# Patient Record
Sex: FEMALE | Race: ASIAN | NOT HISPANIC OR LATINO | Employment: OTHER | URBAN - METROPOLITAN AREA
[De-identification: names, ages, dates, MRNs, and addresses within clinical notes are randomized per-mention and may not be internally consistent; named-entity substitution may affect disease eponyms.]

---

## 2020-07-27 NOTE — PATIENT DISCUSSION
"""Did not have the right trial lens in office. Ordering right and left lens for patient to  and call us to finalize.  Order trials

## 2021-04-29 ENCOUNTER — NEW PATIENT COMPREHENSIVE (OUTPATIENT)
Dept: URBAN - METROPOLITAN AREA CLINIC 35 | Facility: CLINIC | Age: 56
End: 2021-04-29

## 2021-04-29 DIAGNOSIS — H11.132: ICD-10-CM

## 2021-04-29 DIAGNOSIS — H43.393: ICD-10-CM

## 2021-04-29 DIAGNOSIS — H52.11: ICD-10-CM

## 2021-04-29 PROCEDURE — 92004 COMPRE OPH EXAM NEW PT 1/>: CPT

## 2021-04-29 PROCEDURE — 92015 DETERMINE REFRACTIVE STATE: CPT

## 2021-04-29 ASSESSMENT — VISUAL ACUITY
OS_CC: J3
OD_SC: 20/100
OS_SC: J8
OD_CC: 20/30-2
OS_CC: 20/25
OD_CC: J2
OS_SC: 20/40-1
OD_SC: J5

## 2021-04-29 ASSESSMENT — KERATOMETRY
OD_K1POWER_DIOPTERS: 36.75
OD_K2POWER_DIOPTERS: 36.25
OS_K2POWER_DIOPTERS: 36.50
OS_K1POWER_DIOPTERS: 37

## 2021-04-29 ASSESSMENT — TONOMETRY
OD_IOP_MMHG: 15
OS_IOP_MMHG: 15

## 2022-04-04 ENCOUNTER — COMPREHENSIVE EXAM (OUTPATIENT)
Dept: URBAN - METROPOLITAN AREA CLINIC 35 | Facility: CLINIC | Age: 57
End: 2022-04-04

## 2022-04-04 DIAGNOSIS — H52.4: ICD-10-CM

## 2022-04-04 DIAGNOSIS — H52.02: ICD-10-CM

## 2022-04-04 DIAGNOSIS — H52.11: ICD-10-CM

## 2022-04-04 PROCEDURE — 92014 COMPRE OPH EXAM EST PT 1/>: CPT

## 2022-04-04 PROCEDURE — 92015 DETERMINE REFRACTIVE STATE: CPT

## 2022-04-04 ASSESSMENT — VISUAL ACUITY
OD_CC: 20/40
OD_CC: J3
OS_CC: J1
OS_CC: 20/25-1

## 2022-04-04 ASSESSMENT — KERATOMETRY
OS_K2POWER_DIOPTERS: 36.50
OD_K2POWER_DIOPTERS: 36.25
OD_K1POWER_DIOPTERS: 36.75
OS_K1POWER_DIOPTERS: 37

## 2022-04-04 ASSESSMENT — TONOMETRY
OD_IOP_MMHG: 14
OS_IOP_MMHG: 12

## 2023-04-17 ENCOUNTER — COMPREHENSIVE EXAM (OUTPATIENT)
Dept: URBAN - METROPOLITAN AREA CLINIC 35 | Facility: CLINIC | Age: 58
End: 2023-04-17

## 2023-04-17 DIAGNOSIS — H52.4: ICD-10-CM

## 2023-04-17 DIAGNOSIS — H52.02: ICD-10-CM

## 2023-04-17 DIAGNOSIS — H52.11: ICD-10-CM

## 2023-04-17 PROCEDURE — 92014 COMPRE OPH EXAM EST PT 1/>: CPT

## 2023-04-17 PROCEDURE — 92015 DETERMINE REFRACTIVE STATE: CPT

## 2023-04-17 ASSESSMENT — KERATOMETRY
OS_K2POWER_DIOPTERS: 36.50
OD_K1POWER_DIOPTERS: 36.75
OS_K1POWER_DIOPTERS: 37
OD_K2POWER_DIOPTERS: 36.25

## 2023-04-17 ASSESSMENT — VISUAL ACUITY
OS_CC: J1
OD_CC: 20/50
OS_CC: 20/30
OD_CC: J2

## 2023-04-17 ASSESSMENT — TONOMETRY
OD_IOP_MMHG: 12
OS_IOP_MMHG: 14

## 2024-07-11 ENCOUNTER — COMPREHENSIVE EXAM (OUTPATIENT)
Dept: URBAN - METROPOLITAN AREA CLINIC 35 | Facility: CLINIC | Age: 59
End: 2024-07-11

## 2024-07-11 DIAGNOSIS — H52.02: ICD-10-CM

## 2024-07-11 DIAGNOSIS — H52.4: ICD-10-CM

## 2024-07-11 DIAGNOSIS — H52.11: ICD-10-CM

## 2024-07-11 PROCEDURE — 92014 COMPRE OPH EXAM EST PT 1/>: CPT

## 2024-07-11 PROCEDURE — 92015 DETERMINE REFRACTIVE STATE: CPT

## 2024-07-11 ASSESSMENT — VISUAL ACUITY
OD_CC: J4
OS_CC: 20/25
OS_CC: J3
OU_CC: J2
OD_CC: 20/40+2
OU_CC: 20/20-2

## 2024-07-11 ASSESSMENT — TONOMETRY
OD_IOP_MMHG: 15
OS_IOP_MMHG: 15

## 2024-08-07 ENCOUNTER — EMERGENCY VISIT (OUTPATIENT)
Dept: URBAN - METROPOLITAN AREA CLINIC 35 | Facility: CLINIC | Age: 59
End: 2024-08-07

## 2024-08-07 DIAGNOSIS — H43.393: ICD-10-CM

## 2024-08-07 DIAGNOSIS — H43.811: ICD-10-CM

## 2024-08-07 DIAGNOSIS — H25.813: ICD-10-CM

## 2024-08-07 DIAGNOSIS — H04.123: ICD-10-CM

## 2024-08-07 PROCEDURE — 99214 OFFICE O/P EST MOD 30 MIN: CPT

## 2024-08-07 PROCEDURE — 92250 FUNDUS PHOTOGRAPHY W/I&R: CPT

## 2024-08-07 ASSESSMENT — VISUAL ACUITY
OD_CC: 20/50
OD_PH: 20/30
OS_CC: 20/25-1

## 2024-08-07 ASSESSMENT — TONOMETRY
OS_IOP_MMHG: 14
OD_IOP_MMHG: 13

## 2025-03-10 ENCOUNTER — COMPREHENSIVE EXAM (OUTPATIENT)
Age: 60
End: 2025-03-10

## 2025-03-10 DIAGNOSIS — H52.4: ICD-10-CM

## 2025-03-10 DIAGNOSIS — H52.02: ICD-10-CM

## 2025-03-10 PROCEDURE — 92015 DETERMINE REFRACTIVE STATE: CPT

## 2025-03-10 PROCEDURE — 92014 COMPRE OPH EXAM EST PT 1/>: CPT
